# Patient Record
Sex: FEMALE | Race: AMERICAN INDIAN OR ALASKA NATIVE
[De-identification: names, ages, dates, MRNs, and addresses within clinical notes are randomized per-mention and may not be internally consistent; named-entity substitution may affect disease eponyms.]

---

## 2018-08-16 ENCOUNTER — HOSPITAL ENCOUNTER (EMERGENCY)
Dept: HOSPITAL 5 - ED | Age: 23
Discharge: HOME | End: 2018-08-16
Payer: COMMERCIAL

## 2018-08-16 ENCOUNTER — HOSPITAL ENCOUNTER (OUTPATIENT)
Dept: HOSPITAL 5 - TRG | Age: 23
Discharge: HOME | End: 2018-08-16
Attending: OBSTETRICS & GYNECOLOGY
Payer: COMMERCIAL

## 2018-08-16 VITALS — SYSTOLIC BLOOD PRESSURE: 110 MMHG | DIASTOLIC BLOOD PRESSURE: 57 MMHG

## 2018-08-16 VITALS — SYSTOLIC BLOOD PRESSURE: 115 MMHG | DIASTOLIC BLOOD PRESSURE: 67 MMHG

## 2018-08-16 DIAGNOSIS — Z3A.27: ICD-10-CM

## 2018-08-16 DIAGNOSIS — R51: ICD-10-CM

## 2018-08-16 DIAGNOSIS — O9A.212: Primary | ICD-10-CM

## 2018-08-16 DIAGNOSIS — J45.909: ICD-10-CM

## 2018-08-16 DIAGNOSIS — O47.02: Primary | ICD-10-CM

## 2018-08-16 DIAGNOSIS — R10.30: ICD-10-CM

## 2018-08-16 DIAGNOSIS — Z3A.26: ICD-10-CM

## 2018-08-16 DIAGNOSIS — Z88.0: ICD-10-CM

## 2018-08-16 PROCEDURE — 76819 FETAL BIOPHYS PROFIL W/O NST: CPT

## 2018-08-16 PROCEDURE — 76810 OB US >/= 14 WKS ADDL FETUS: CPT

## 2018-08-16 PROCEDURE — 76805 OB US >/= 14 WKS SNGL FETUS: CPT

## 2018-08-16 PROCEDURE — 59025 FETAL NON-STRESS TEST: CPT

## 2018-08-16 PROCEDURE — 96360 HYDRATION IV INFUSION INIT: CPT
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
